# Patient Record
Sex: FEMALE | Race: WHITE | Employment: FULL TIME | URBAN - METROPOLITAN AREA
[De-identification: names, ages, dates, MRNs, and addresses within clinical notes are randomized per-mention and may not be internally consistent; named-entity substitution may affect disease eponyms.]

---

## 2020-09-02 ENCOUNTER — HOSPITAL ENCOUNTER (EMERGENCY)
Age: 25
Discharge: HOME | End: 2020-09-02
Attending: STUDENT IN AN ORGANIZED HEALTH CARE EDUCATION/TRAINING PROGRAM
Payer: COMMERCIAL

## 2020-09-02 ENCOUNTER — APPOINTMENT (OUTPATIENT)
Dept: GENERAL RADIOLOGY | Age: 25
End: 2020-09-02
Payer: COMMERCIAL

## 2020-09-02 VITALS
OXYGEN SATURATION: 100 % | HEART RATE: 84 BPM | WEIGHT: 152 LBS | DIASTOLIC BLOOD PRESSURE: 67 MMHG | RESPIRATION RATE: 17 BRPM | BODY MASS INDEX: 26.93 KG/M2 | SYSTOLIC BLOOD PRESSURE: 125 MMHG | TEMPERATURE: 98.5 F

## 2020-09-02 LAB
ANION GAP SERPL CALCULATED.3IONS-SCNC: 5 MMOL/L (ref 3–16)
BACTERIA: ABNORMAL /HPF
BASOPHILS ABSOLUTE: 0.1 K/UL (ref 0–0.2)
BASOPHILS RELATIVE PERCENT: 0.5 %
BILIRUBIN URINE: NEGATIVE
BLOOD, URINE: NEGATIVE
BUN BLDV-MCNC: 13 MG/DL (ref 7–20)
CALCIUM SERPL-MCNC: 9.6 MG/DL (ref 8.3–10.6)
CHLORIDE BLD-SCNC: 103 MMOL/L (ref 99–110)
CLARITY: ABNORMAL
CO2: 28 MMOL/L (ref 21–32)
COLOR: YELLOW
CREAT SERPL-MCNC: 0.8 MG/DL (ref 0.6–1.1)
EKG ATRIAL RATE: 84 BPM
EKG DIAGNOSIS: NORMAL
EKG P AXIS: 69 DEGREES
EKG P-R INTERVAL: 136 MS
EKG Q-T INTERVAL: 370 MS
EKG QRS DURATION: 74 MS
EKG QTC CALCULATION (BAZETT): 437 MS
EKG R AXIS: 77 DEGREES
EKG T AXIS: 64 DEGREES
EKG VENTRICULAR RATE: 84 BPM
EOSINOPHILS ABSOLUTE: 0.1 K/UL (ref 0–0.6)
EOSINOPHILS RELATIVE PERCENT: 0.4 %
EPITHELIAL CELLS, UA: 4 /HPF (ref 0–5)
GFR AFRICAN AMERICAN: >60
GFR NON-AFRICAN AMERICAN: >60
GLUCOSE BLD-MCNC: 102 MG/DL (ref 70–99)
GLUCOSE URINE: NEGATIVE MG/DL
HCG QUALITATIVE: NEGATIVE
HCT VFR BLD CALC: 43.5 % (ref 36–48)
HEMOGLOBIN: 14.8 G/DL (ref 12–16)
HYALINE CASTS: 2 /LPF (ref 0–8)
KETONES, URINE: NEGATIVE MG/DL
LEUKOCYTE ESTERASE, URINE: ABNORMAL
LYMPHOCYTES ABSOLUTE: 3.1 K/UL (ref 1–5.1)
LYMPHOCYTES RELATIVE PERCENT: 24.2 %
MCH RBC QN AUTO: 30.4 PG (ref 26–34)
MCHC RBC AUTO-ENTMCNC: 33.9 G/DL (ref 31–36)
MCV RBC AUTO: 89.6 FL (ref 80–100)
MICROSCOPIC EXAMINATION: YES
MONOCYTES ABSOLUTE: 1.3 K/UL (ref 0–1.3)
MONOCYTES RELATIVE PERCENT: 10.1 %
NEUTROPHILS ABSOLUTE: 8.3 K/UL (ref 1.7–7.7)
NEUTROPHILS RELATIVE PERCENT: 64.8 %
NITRITE, URINE: NEGATIVE
PDW BLD-RTO: 13.1 % (ref 12.4–15.4)
PH UA: 5.5 (ref 5–8)
PLATELET # BLD: 319 K/UL (ref 135–450)
PMV BLD AUTO: 7.1 FL (ref 5–10.5)
POTASSIUM SERPL-SCNC: 4 MMOL/L (ref 3.5–5.1)
PROTEIN UA: NEGATIVE MG/DL
RBC # BLD: 4.86 M/UL (ref 4–5.2)
RBC UA: 2 /HPF (ref 0–4)
SODIUM BLD-SCNC: 136 MMOL/L (ref 136–145)
SPECIFIC GRAVITY UA: 1.02 (ref 1–1.03)
URINE REFLEX TO CULTURE: YES
URINE TYPE: ABNORMAL
UROBILINOGEN, URINE: 0.2 E.U./DL
WBC # BLD: 12.9 K/UL (ref 4–11)
WBC UA: 16 /HPF (ref 0–5)

## 2020-09-02 PROCEDURE — 36415 COLL VENOUS BLD VENIPUNCTURE: CPT

## 2020-09-02 PROCEDURE — 84703 CHORIONIC GONADOTROPIN ASSAY: CPT

## 2020-09-02 PROCEDURE — 81001 URINALYSIS AUTO W/SCOPE: CPT

## 2020-09-02 PROCEDURE — 84443 ASSAY THYROID STIM HORMONE: CPT

## 2020-09-02 PROCEDURE — 80048 BASIC METABOLIC PNL TOTAL CA: CPT

## 2020-09-02 PROCEDURE — 87086 URINE CULTURE/COLONY COUNT: CPT

## 2020-09-02 PROCEDURE — 71046 X-RAY EXAM CHEST 2 VIEWS: CPT

## 2020-09-02 PROCEDURE — 85025 COMPLETE CBC W/AUTO DIFF WBC: CPT

## 2020-09-02 PROCEDURE — 99285 EMERGENCY DEPT VISIT HI MDM: CPT

## 2020-09-02 PROCEDURE — 93005 ELECTROCARDIOGRAM TRACING: CPT | Performed by: STUDENT IN AN ORGANIZED HEALTH CARE EDUCATION/TRAINING PROGRAM

## 2020-09-02 PROCEDURE — 93010 ELECTROCARDIOGRAM REPORT: CPT | Performed by: INTERNAL MEDICINE

## 2020-09-02 RX ORDER — ESCITALOPRAM OXALATE 5 MG/5ML
10 SOLUTION ORAL DAILY
COMMUNITY

## 2020-09-02 RX ORDER — NITROFURANTOIN 25; 75 MG/1; MG/1
100 CAPSULE ORAL 2 TIMES DAILY
Qty: 14 CAPSULE | Refills: 0 | Status: SHIPPED | OUTPATIENT
Start: 2020-09-02 | End: 2020-09-09

## 2020-09-02 RX ORDER — ALPRAZOLAM 0.25 MG/1
0.25 TABLET ORAL 2 TIMES DAILY PRN
COMMUNITY
Start: 2019-10-08

## 2020-09-02 RX ORDER — ARIPIPRAZOLE 2 MG/1
TABLET ORAL
COMMUNITY
Start: 2020-06-27

## 2020-09-02 ASSESSMENT — PAIN DESCRIPTION - LOCATION: LOCATION: CHEST

## 2020-09-02 ASSESSMENT — PAIN SCALES - GENERAL: PAINLEVEL_OUTOF10: 3

## 2020-09-02 ASSESSMENT — HEART SCORE: ECG: 0

## 2020-09-02 NOTE — ED PROVIDER NOTES
[address] [facility]        Pt Name: Dennise Coburn  MRN: [id number]  [place] 1995  Date of evaluation: 9/2/2020  Provider: JEAN CARLOS Dalal CNP  PCP: [address] [place] [address] [place].     I have seen and evaluated this patient with my supervising physician. Dr. Aislinn Broussard       Chief Complaint   Patient presents with    Palpitations     Pt was sitting when her HR jumped to 140-150's per her apple watch. Called PCP and he wanted her seen. Felt like something was sitting on her chest.  Still feels light headed       HISTORY OF PRESENT ILLNESS   (Location, Timing/Onset, Context/Setting, Quality, Duration, Modifying Factors, Severity, Associated Signs and Symptoms)  Note limiting factors. Dennise Coburn is a 21 y.o. female with medical history of ADHD, OCD, anxiety who presents the ED with complaints of an episode of palpitations, tachycardia, and feeling lightheaded occurred approximately noon today. Patient says she woke this morning and was having some chest discomfort. She took her Xanax 0.25 that she takes every morning. She drank 1 cup of coffee and went to work. Patient says she was taking her lunch break at work whenever the symptoms occurred. She looked at her watch and notes that her heart rate was 140, although patient did not verified by checking her pulse. She said this episode lasted for approximately 2 hours and subsided on its own. Patient is currently asymptomatic and is not having any palpitations or chest pain at this time. She did have more frequent bowel movements today, however they were not loose or watery. Denies any associated nausea, vomiting, diarrhea, neck pain, back pain, headache, dizzy, cough, leg swelling, or fevers. Denies any history of previous cardiac testing to include a Holter, stress, echo, or cardiac cath. She is not on hormone replacement therapy.   Unsure of LMP as she said these are irregular. Denies any smoking, alcohol use, or street drugs. Nursing Notes were all reviewed and agreed with or any disagreements were addressed in the HPI. REVIEW OF SYSTEMS    (2-9 systems for level 4, 10 or more for level 5)     Review of Systems    Positives and Pertinent negatives as per HPI. Except as noted above in the ROS, all other systems were reviewed and negative. PAST MEDICAL HISTORY     Past Medical History:   Diagnosis Date    ADHD (attention deficit hyperactivity disorder)     OCD (obsessive compulsive disorder)     PONV (postoperative nausea and vomiting)     Seasonal allergies          SURGICAL HISTORY     Past Surgical History:   Procedure Laterality Date    OTHER SURGICAL HISTORY Bilateral 07/07/2015    BILATERAL BREAST REDUCTION MAMMOPLASTY    TYMPANOSTOMY TUBE PLACEMENT      WISDOM TOOTH EXTRACTION           CURRENTMEDICATIONS       Discharge Medication List as of 9/2/2020  7:00 PM      CONTINUE these medications which have NOT CHANGED    Details   ALPRAZolam (XANAX) 0.25 MG tablet Take 0.25 mg by mouth 2 times daily as needed. Historical Med      ARIPiprazole (ABILIFY) 2 MG tablet Historical Med      escitalopram (LEXAPRO) 5 MG/5ML solution 10 mg by Nasogastric route dailyHistorical Med      amphetamine-dextroamphetamine (ADDERALL XR) 30 MG XR capsule Take 10 mg by mouth every morning. Historical Med      levocetirizine (XYZAL) 5 MG tablet Take 5 mg by mouth nightly      montelukast (SINGULAIR) 10 MG tablet Take 10 mg by mouth nightly      sertraline (ZOLOFT) 100 MG tablet Take 175 mg by mouth daily      hydrOXYzine (ATARAX) 50 MG tablet Take 50 mg by mouth nightly               ALLERGIES     Bupropion; Duloxetine hcl; Fluoxetine; Oxycodone; and Oxycodone-acetaminophen    FAMILYHISTORY     No family history on file.        SOCIAL HISTORY       Social History     Tobacco Use    Smoking status: Never Smoker   Substance Use Topics    Alcohol use: No    Drug use: No       SCREENINGS      Heart Score for chest pain patients  History: Slightly Suspicious  ECG: Normal  Patient Age: < 45 years  Risk Factors: No risk factors known      PHYSICAL EXAM    (up to 7 for level 4, 8 or more for level 5)     ED Triage Vitals [09/02/20 1457]   BP Temp Temp Source Pulse Resp SpO2 Height Weight   109/74 98.5 °F (36.9 °C) Temporal 86 16 95 % -- 152 lb (68.9 kg)       Physical Exam  Vitals signs and nursing note reviewed. Constitutional:       General: She is awake. Appearance: Normal appearance. She is well-developed and overweight. HENT:      Head: Normocephalic and atraumatic. Nose: Nose normal.   Eyes:      General:         Right eye: No discharge. Left eye: No discharge. Neck:      Musculoskeletal: Normal range of motion. Cardiovascular:      Rate and Rhythm: Normal rate and regular rhythm. Heart sounds: Normal heart sounds. Comments: Pulse 85  Pulmonary:      Effort: Pulmonary effort is normal. No respiratory distress. Breath sounds: Normal breath sounds. Abdominal:      General: Bowel sounds are normal.      Palpations: Abdomen is soft. Tenderness: There is abdominal tenderness in the suprapubic area. Musculoskeletal: Normal range of motion. Right lower leg: No edema. Left lower leg: No edema. Skin:     General: Skin is warm and dry. Coloration: Skin is not pale. Neurological:      Mental Status: She is alert and oriented to person, place, and time. Psychiatric:         Behavior: Behavior normal. Behavior is cooperative.          DIAGNOSTIC RESULTS   LABS:    Labs Reviewed   CBC WITH AUTO DIFFERENTIAL - Abnormal; Notable for the following components:       Result Value    WBC 12.9 (*)     Neutrophils Absolute 8.3 (*)     All other components within normal limits    Narrative:     Performed at:  [facility] [address], [place], [facility] [address]   Phone [phone]   BASIC METABOLIC PANEL - Abnormal; Notable for the following components:    Glucose 102 (*)     All other components within normal limits    Narrative:     Performed at:  [facility] [address] [address] [place] [employer]   Phone [phone]   URINE RT REFLEX TO CULTURE - Abnormal; Notable for the following components:    Clarity, UA CLOUDY (*)     Leukocyte Esterase, Urine MODERATE (*)     All other components within normal limits    Narrative:     Performed at:  [facility] [address], [place], [address] [employer]   Phone [phone]   MICROSCOPIC URINALYSIS - Abnormal; Notable for the following components:    Bacteria, UA 1+ (*)     WBC, UA 16 (*)     All other components within normal limits    Narrative:     Performed at:  [facility] [address] [address] [place] [employer]   Phone [phone]   CULTURE, URINE   HCG, SERUM, QUALITATIVE    Narrative:     Performed at:  [facility] [address], [place], [address]   Phone [phone]   TSH WITH REFLEX       All other labs were within normal range or not returned as of this dictation. EKG: All EKG's are interpreted by the Emergency Department Physician in the absence of a cardiologist.  Please see their note for interpretation of EKG. RADIOLOGY:   Non-plain film images such as CT, Ultrasound and MRI are read by the radiologist. Plain radiographic images are visualized and preliminarily interpreted by the ED Provider with the below findings:        Interpretation per the Radiologist below, if available at the time of this note:    XR CHEST (2 VW)   Final Result   No radiographic evidence of acute pulmonary disease. Xr Chest (2 Vw)    Result Date: 9/2/2020  EXAMINATION: TWO XRAY VIEWS OF THE CHEST 9/2/2020 3:14 pm COMPARISON: None.  HISTORY: ORDERING SYSTEM PROVIDED HISTORY: chest pain TECHNOLOGIST PROVIDED HISTORY: Reason for exam:->chest pain Reason for Exam: cp Acuity: Unknown Type of Exam: Unknown FINDINGS: HEART/MEDIASTINUM: The cardiomediastinal silhouette is within normal limits. PLEURA/LUNGS: There are no focal consolidations or pleural effusions. There is no appreciable pneumothorax. BONES/SOFT TISSUE: No acute abnormality. No radiographic evidence of acute pulmonary disease. PROCEDURES   Unless otherwise noted below, none     Procedures    CRITICAL CARE TIME   N/A    CONSULTS:  None      EMERGENCY DEPARTMENT COURSE and DIFFERENTIAL DIAGNOSIS/MDM:   Vitals:    Vitals:    09/02/20 1457 09/02/20 1818   BP: 109/74 125/67   Pulse: 86 84   Resp: 16 17   Temp: 98.5 °F (36.9 °C)    TempSrc: Temporal    SpO2: 95% 100%   Weight: 152 lb (68.9 kg)        Patient was given the following medications:  Medications - No data to display        Pertinent Labs & Imaging studies reviewed. (See chart for details)   -  Patient seen and evaluated in the emergency department. -  Triage and nursing notes reviewed and incorporated. -  Old chart records reviewed and incorporated. -  Patient case discussed with attending physician, Dr. Joaquin Olivera. They saw and examined patient. -  Differential diagnosis includes: hyperthyroid, dehydration, anxiety, panic attack, UTI, pneumonia, pneumothorax verse COVID-19  -  Work-up included:  See above CXR, EKG, CBC, BMP, hCG, UA  -  ED treatment included:  none  - Consults: Home  -  Results discussed with patient. Labs show  CBC with WBC 12.9. BMP with glucose 102. UA shows cloudy clarity, moderate leukocytes, 1+ bacteria with 16 WBC. Urine culture is pending. CXR shows no radiographic evidence of acute cardiopulmonary disease. Patient was instructed on home care and follow-up with [facility] in outpatient Holter test.  Instructed to stop caffeine intake. Pt was given strict return precautions.  The patient is agreeable with plan of care and disposition.  -  Disposition: None    Applicable in this patient who has low clinical suspicion for pulmonary embolism. Age over 48years old: No  Heart rate greater [id number] bpm: No  Oxygen saturation less than 95%: No  Hemoptysis: No  Exogenous estrogen use: No  Prior history of DVT or PE: No  Unilateral leg swelling: No  Surgery or significant trauma in the past 4 weeks: No    Based on the above, PE can effectively be ruled out without further testing. FINAL IMPRESSION      1. Palpitations    2.  Acute UTI          DISPOSITION/PLAN   DISPOSITION Decision To Discharge 09/02/2020 06:50:37 PM      PATIENT REFERREDTO:  Michelle Esqueda MD  [address] [facility] [phone]  [facility] [address] [place] [address]  [phone]    Call in 2 days  As needed    [facility] Emergency Department  [address] [facility]  [phone]  Go to   As needed    [address] [address] [address]  [phone]  Call   to set-up holter monitor      DISCHARGE MEDICATIONS:  Discharge Medication List as of 9/2/2020  7:00 PM      START taking these medications    Details   nitrofurantoin, macrocrystal-monohydrate, (MACROBID) 100 MG capsule Take 1 capsule by mouth 2 times daily for 7 days, Disp-14 capsule,R-0Print             DISCONTINUED MEDICATIONS:  Discharge Medication List as of 9/2/2020  7:00 PM                 (Please note that portions of this note were completed with a voice recognition program.  Efforts were made to edit the dictations but occasionally words are mis-transcribed.)    JEAN CARLOS Griffin CNP (electronically signed)            JEAN CARLOS Griffin CNP  09/02/20 1946

## 2020-09-02 NOTE — ED PROVIDER NOTES
I independently performed a history and physical on Maribeth Greenfield. All diagnostic, treatment, and disposition decisions were made by myself in conjunction with the advanced practice provider. Briefly, this is a 21 y.o. female history of anxiety attacks on Xanax here for palpitations and elevated heart rate. Patient tells me that she was sitting down this morning when she felt palpitations, shortness of breath and chest pressure. She wears an apple watch which alerted her that her heart rate was in the 140s. She did not check her own pulse. She states that this lasted for several minutes and is now completely resolved. Currently is not having any chest pain, shortness of breath or palpitations. Pt denies unilateral leg pain or swelling, hormone therapy, recent surgery/travel/hospital stay, prior DVT/PE, hemoptysis, or hx of cancer or chemo. 1 cup of coffee daily. States that she recently started a medication for eczema but denies any other medication changes or drug use. No family history of cardiac disease or sudden death. On exam, she is comfortable and conversant. Regular rate and rhythm, normal S1 and S2 without murmurs. Lungs are clear to auscultation bilaterally. No JVD. Abdomen soft with mild suprapubic tenderness, no guarding. Bilateral calves without edema or tenderness. Positive eczematous changes to bilateral lower extremities. EKG  The Ekg interpreted by me in the absence of a cardiologist shows. normal sinus rhythm with a rate of 84  Axis is   Normal  QTc is  normal  Intervals and Durations are unremarkable. No specific ST-T wave changes appreciated. No evidence of acute ischemia.    No prior      Labs Reviewed   CBC WITH AUTO DIFFERENTIAL - Abnormal; Notable for the following components:       Result Value    WBC 12.9 (*)     Neutrophils Absolute 8.3 (*)     All other components within normal limits    Narrative:     Performed at:  [facility]  [address]   Phone [phone]   BASIC METABOLIC PANEL - Abnormal; Notable for the following components:    Glucose 102 (*)     All other components within normal limits    Narrative:     Performed at:  [facility]  [address] [place]   Phone [phone]   URINE RT REFLEX TO CULTURE - Abnormal; Notable for the following components:    Clarity, UA CLOUDY (*)     Leukocyte Esterase, Urine MODERATE (*)     All other components within normal limits    Narrative:     Performed at:  [facility]  [address]   Phone [phone]   MICROSCOPIC URINALYSIS - Abnormal; Notable for the following components:    Bacteria, UA 1+ (*)     WBC, UA 16 (*)     All other components within normal limits    Narrative:     Performed at:  [facility]  [address]   Phone [phone]   CULTURE, URINE   HCG, SERUM, QUALITATIVE    Narrative:     Performed at:  [facility]  [address]   Phone [phone]   TSH WITH REFLEX             Screenings           Heart Score for chest pain patients  History: Slightly Suspicious  ECG: Normal  Patient Age: < 45 years  Risk Factors: No risk factors known       MDM  Course and MDM:  Patient is 50-year-old female presenting to the ER for palpitations in the setting of tachycardia documented on her apple watch. On arrival she is comfortable appearing and hemodynamically stable. She has an unremarkable cardiorespiratory exam.  She is PERC negative and PE is ruled out. EKG as above is nonischemic without signs of arrhythmia, Wellens or Brugada's.  HEART score is 0, low suspicion for ACS.  laboratory studies are unremarkable with exception of UTI and she is having some mild suprapubic tenderness on exam so will treat as an outpatient with Macrobid. I counseled her to avoid any caffeine intake. We have put in a referral for Holter monitor which she will  tomorrow. She was given strict precautions to return to the ER if she has any new palpitations, chest pain, shortness of breath or near syncope. She is agreeable to plan. Patient Referrals:  Jerson Matamoros MD  [id number] [facility] [address]  [facility]. [address] [address]  [phone]    Call in 2 days  As needed    [facility] Emergency Department  [address] [facility]  [phone]  Go to   As needed    [address] [address]  [address] [place]  [phone]  Call   to set-up holter monitor      Discharge Medications:  Discharge Medication List as of 9/2/2020  7:00 PM      START taking these medications    Details   nitrofurantoin, macrocrystal-monohydrate, (MACROBID) 100 MG capsule Take 1 capsule by mouth 2 times daily for 7 days, Disp-14 capsule,R-0Print             FINAL IMPRESSION  1. Palpitations    2. Acute UTI        Blood pressure 125/67, pulse 84, temperature 98.5 °F (36.9 °C), temperature source Temporal, resp. rate 17, weight 152 lb (68.9 kg), SpO2 100 %.      For further details of [facility] emergency department encounter, please see documentation by advanced practice provider       Evon Licea MD  09/02/20 7089

## 2020-09-03 ENCOUNTER — TELEPHONE (OUTPATIENT)
Dept: CARDIOLOGY CLINIC | Age: 25
End: 2020-09-03

## 2020-09-03 LAB
TSH REFLEX: 0.82 UIU/ML (ref 0.27–4.2)
URINE CULTURE, ROUTINE: NORMAL

## 2020-09-03 NOTE — TELEPHONE ENCOUNTER
Noted. Edwin Horn (R[facility]INSON), Obstetrics and Gynecology  [address] [place] [address]  Phone: [phone]  Fax: [phone]

## 2020-09-03 NOTE — TELEPHONE ENCOUNTER
Order for holter in now in patient's chart, please call pt to schedule nurse visit for CAM patch holter.

## 2020-09-03 NOTE — TELEPHONE ENCOUNTER
Pt was sent to the [facility] ER yesterday by her PCP and they told her to call us to set up a Holter monitor for palpitations and elevated heart rate. There is no order in the computer, so I reached out to Dianna Christianson who then instructed me to put in a message to see if we can put an order in for this pt to come in and get one. Pt also states she is currently having palpitations and that her heart is racing. Pt was really insistent on the phone that she needs to get this Holter today.     Please reach out to the pt at [phone]

## 2020-09-11 ENCOUNTER — TELEPHONE (OUTPATIENT)
Dept: CARDIOLOGY CLINIC | Age: 25
End: 2020-09-11

## 2020-09-11 PROCEDURE — 0298T PR EXT ECG > 48HR TO 21 DAY REVIEW AND INTERPRETATN: CPT | Performed by: INTERNAL MEDICINE

## 2020-09-11 PROCEDURE — 0296T PR EXT ECG > 48HR TO 21 DAY RCRD W/CONECT INTL RCRD: CPT | Performed by: INTERNAL MEDICINE

## 2020-09-11 NOTE — TELEPHONE ENCOUNTER
Called patient with result message from [facility] CAM 48 hour holter monitor. Patient verbalized and confirmed understanding. Placed in [employer].

## 2021-06-11 ENCOUNTER — APPOINTMENT (OUTPATIENT)
Dept: CT IMAGING | Age: 26
End: 2021-06-11
Payer: COMMERCIAL

## 2021-06-11 ENCOUNTER — HOSPITAL ENCOUNTER (EMERGENCY)
Age: 26
Discharge: HOME | End: 2021-06-11
Attending: EMERGENCY MEDICINE
Payer: COMMERCIAL

## 2021-06-11 VITALS
RESPIRATION RATE: 20 BRPM | HEART RATE: 72 BPM | BODY MASS INDEX: 30.65 KG/M2 | WEIGHT: 173 LBS | SYSTOLIC BLOOD PRESSURE: 111 MMHG | DIASTOLIC BLOOD PRESSURE: 89 MMHG | TEMPERATURE: 98.3 F | HEIGHT: 63 IN | OXYGEN SATURATION: 100 %

## 2021-06-11 DIAGNOSIS — K31.84 GASTROPARESIS: Primary | ICD-10-CM

## 2021-06-11 LAB
A/G RATIO: 1.2 (ref 1.1–2.2)
ALBUMIN SERPL-MCNC: 4 G/DL (ref 3.4–5)
ALP BLD-CCNC: 94 U/L (ref 40–129)
ALT SERPL-CCNC: 54 U/L (ref 10–40)
ANION GAP SERPL CALCULATED.3IONS-SCNC: 7 MMOL/L (ref 3–16)
AST SERPL-CCNC: 31 U/L (ref 15–37)
BASOPHILS ABSOLUTE: 0 K/UL (ref 0–0.2)
BASOPHILS RELATIVE PERCENT: 0.3 %
BILIRUB SERPL-MCNC: <0.2 MG/DL (ref 0–1)
BUN BLDV-MCNC: 13 MG/DL (ref 7–20)
CALCIUM SERPL-MCNC: 9.1 MG/DL (ref 8.3–10.6)
CHLORIDE BLD-SCNC: 104 MMOL/L (ref 99–110)
CO2: 25 MMOL/L (ref 21–32)
CREAT SERPL-MCNC: 0.7 MG/DL (ref 0.6–1.1)
EOSINOPHILS ABSOLUTE: 0.1 K/UL (ref 0–0.6)
EOSINOPHILS RELATIVE PERCENT: 0.9 %
GFR AFRICAN AMERICAN: >60
GFR NON-AFRICAN AMERICAN: >60
GLOBULIN: 3.3 G/DL
GLUCOSE BLD-MCNC: 81 MG/DL (ref 70–99)
HCG QUALITATIVE: NEGATIVE
HCT VFR BLD CALC: 39.4 % (ref 36–48)
HEMOGLOBIN: 13.3 G/DL (ref 12–16)
LIPASE: 27 U/L (ref 13–60)
LYMPHOCYTES ABSOLUTE: 3.4 K/UL (ref 1–5.1)
LYMPHOCYTES RELATIVE PERCENT: 25.9 %
MCH RBC QN AUTO: 29.8 PG (ref 26–34)
MCHC RBC AUTO-ENTMCNC: 33.8 G/DL (ref 31–36)
MCV RBC AUTO: 88.1 FL (ref 80–100)
MONOCYTES ABSOLUTE: 1.3 K/UL (ref 0–1.3)
MONOCYTES RELATIVE PERCENT: 9.6 %
NEUTROPHILS ABSOLUTE: 8.4 K/UL (ref 1.7–7.7)
NEUTROPHILS RELATIVE PERCENT: 63.3 %
PDW BLD-RTO: 13.2 % (ref 12.4–15.4)
PLATELET # BLD: 323 K/UL (ref 135–450)
PMV BLD AUTO: 6.9 FL (ref 5–10.5)
POTASSIUM REFLEX MAGNESIUM: 4 MMOL/L (ref 3.5–5.1)
RBC # BLD: 4.47 M/UL (ref 4–5.2)
SODIUM BLD-SCNC: 136 MMOL/L (ref 136–145)
TOTAL PROTEIN: 7.3 G/DL (ref 6.4–8.2)
TSH REFLEX: 1.16 UIU/ML (ref 0.27–4.2)
WBC # BLD: 13.3 K/UL (ref 4–11)

## 2021-06-11 PROCEDURE — 99283 EMERGENCY DEPT VISIT LOW MDM: CPT

## 2021-06-11 PROCEDURE — 74177 CT ABD & PELVIS W/CONTRAST: CPT

## 2021-06-11 PROCEDURE — 83690 ASSAY OF LIPASE: CPT

## 2021-06-11 PROCEDURE — 84703 CHORIONIC GONADOTROPIN ASSAY: CPT

## 2021-06-11 PROCEDURE — 84443 ASSAY THYROID STIM HORMONE: CPT

## 2021-06-11 PROCEDURE — 85025 COMPLETE CBC W/AUTO DIFF WBC: CPT

## 2021-06-11 PROCEDURE — 6360000004 HC RX CONTRAST MEDICATION: Performed by: EMERGENCY MEDICINE

## 2021-06-11 PROCEDURE — 36415 COLL VENOUS BLD VENIPUNCTURE: CPT

## 2021-06-11 PROCEDURE — 2580000003 HC RX 258: Performed by: EMERGENCY MEDICINE

## 2021-06-11 PROCEDURE — 80053 COMPREHEN METABOLIC PANEL: CPT

## 2021-06-11 RX ORDER — SUCRALFATE 1 G/1
1 TABLET ORAL 4 TIMES DAILY
COMMUNITY

## 2021-06-11 RX ORDER — FAMOTIDINE 10 MG
10 TABLET ORAL 2 TIMES DAILY
COMMUNITY

## 2021-06-11 RX ORDER — TOPIRAMATE 100 MG/1
100 TABLET, FILM COATED ORAL NIGHTLY PRN
COMMUNITY

## 2021-06-11 RX ORDER — PANTOPRAZOLE SODIUM 40 MG/1
40 GRANULE, DELAYED RELEASE ORAL
COMMUNITY

## 2021-06-11 RX ORDER — METOCLOPRAMIDE 10 MG/1
10 TABLET ORAL
Qty: 120 TABLET | Refills: 3 | Status: SHIPPED | OUTPATIENT
Start: 2021-06-11

## 2021-06-11 RX ORDER — MODAFINIL 100 MG/1
100 TABLET ORAL DAILY
COMMUNITY

## 2021-06-11 RX ORDER — PROMETHAZINE HYDROCHLORIDE 25 MG/1
25 TABLET ORAL EVERY 6 HOURS PRN
COMMUNITY

## 2021-06-11 RX ORDER — AZELASTINE 1 MG/ML
1 SPRAY, METERED NASAL 2 TIMES DAILY
COMMUNITY

## 2021-06-11 RX ORDER — LORAZEPAM 2 MG/1
2 TABLET ORAL NIGHTLY PRN
COMMUNITY

## 2021-06-11 RX ORDER — POLYETHYLENE GLYCOL 3350 17 G/17G
17 POWDER, FOR SOLUTION ORAL DAILY PRN
COMMUNITY

## 2021-06-11 RX ORDER — 0.9 % SODIUM CHLORIDE 0.9 %
1000 INTRAVENOUS SOLUTION INTRAVENOUS ONCE
Status: COMPLETED | OUTPATIENT
Start: 2021-06-11 | End: 2021-06-11

## 2021-06-11 RX ORDER — ONDANSETRON 4 MG/1
4 TABLET, FILM COATED ORAL EVERY 8 HOURS PRN
COMMUNITY

## 2021-06-11 RX ORDER — LEVOMEFOLATE/ALGAL OIL 15-90.314
CAPSULE ORAL
COMMUNITY

## 2021-06-11 RX ORDER — DOCUSATE SODIUM 100 MG/1
100 CAPSULE, LIQUID FILLED ORAL 2 TIMES DAILY
COMMUNITY

## 2021-06-11 RX ADMIN — IOPAMIDOL 80 ML: 755 INJECTION, SOLUTION INTRAVENOUS at 18:46

## 2021-06-11 RX ADMIN — SODIUM CHLORIDE 1000 ML: 0.9 INJECTION, SOLUTION INTRAVENOUS at 17:00

## 2021-06-11 ASSESSMENT — ENCOUNTER SYMPTOMS
CONSTIPATION: 1
VOMITING: 1
ABDOMINAL PAIN: 1
NAUSEA: 1
DIARRHEA: 0

## 2021-06-11 NOTE — ED NOTES
Boxed meal given turkey sandwich and soda per patients request, Dr. Olga White in to speak with patient regarding results     Cheryle Shipper, RN  06/11/21 1950

## 2021-06-11 NOTE — ED PROVIDER NOTES
[facility] ENCOUNTER          ATTENDING PHYSICIAN NOTE       Date of evaluation: 6/11/2021    Chief Complaint     Abdominal Pain and Depression      History of Present Illness     Vandana Veliz is a 31 y.o. female a history of anxiety, OCD, ADHD, TBI (2016) with post-concussion syndrome, post-traumatic headache, chronic pain, low back pain, neck pain. About a year ago she began having generalized abdominal pain with intractable nausea and since then has undergone testing and been seen by GI on multiple occasions. She had a recent EGD that showed ulcerative gastritis and a gastric emptying study that showed delayed gastric emptying. She is currently on Phenergan and Zofran for the nausea but it is not helping. She states that she continues to vomit 2-3 times a day and despite calling on multiple occasions to get a follow-up appointment with her GI doctor through [facility], she feels she has been given the run around and is unable to schedule any type of follow-up to determine if a different type of medication or additional testing is necessary. Apparently when she reached out today she mentioned something about suicidal thoughts and they told her to come to the emergency department for that reason before they would be willing to see her again. Currently the patient is not suicidal and she does not want to hurt herself and has no current thoughts of harming herself. She just states that the frustration drives her to feel like there is no hope and she does not want to do therefore she expresses herself as having suicidal thoughts which are not actually true at this time. The patient is currently having some mild periumbilical and epigastric discomfort but is not currently nauseous.  The patient has been given dietary recommendations which have not helped and she has been put on multiple different medications which have not helped and she is not sure where to go since symptoms are not improving and this has been going on for a year. Review of Systems     Review of Systems   Constitutional: Negative for chills and fever. Cardiovascular: Negative for chest pain. Gastrointestinal: Positive for abdominal pain, constipation, nausea and vomiting. Negative for diarrhea. Genitourinary: Negative for difficulty urinating and dysuria. Neurological: Negative for light-headedness. Psychiatric/Behavioral: Negative for self-injury. All other systems reviewed and are negative. Past Medical, Surgical, Family, and Social History     She has a past medical history of ADHD (attention deficit hyperactivity disorder), Anxiety, Depression, Gastroparesis, OCD (obsessive compulsive disorder), PONV (postoperative nausea and vomiting), PTSD (post-traumatic stress disorder), Seasonal allergies, and Vestibular migraine. She has a past surgical history that includes Tympanostomy tube placement; Banco tooth extraction; and other surgical history (Bilateral, 07/07/2015). Her family history is not on file. She reports that she has never smoked. She has never used smokeless tobacco. She reports that she does not drink alcohol and does not use drugs. Medications     Previous Medications    ALPRAZOLAM (XANAX) 0.25 MG TABLET    Take 0.25 mg by mouth 2 times daily as needed. ARIPIPRAZOLE (ABILIFY) 2 MG TABLET        AZELASTINE (ASTELIN) 0.1 % NASAL SPRAY    1 spray by Nasal route 2 times daily Use in each nostril as directed    DOCUSATE SODIUM (COLACE) 100 MG CAPSULE    Take 100 mg by mouth 2 times daily    DUPILUMAB (DUPIXENT) 200 MG/1. 14ML SOSY INJECTION    Inject 200 mg into the skin once    ESCITALOPRAM (LEXAPRO) 5 MG/5ML SOLUTION    10 mg by Nasogastric route daily    FAMOTIDINE (PEPCID) 10 MG TABLET    Take 10 mg by mouth 2 times daily    L-METHYLFOLATE-ALGAE (DEPLIN 15) 15-90.314 MG CAPS    Take by mouth    LEVOCETIRIZINE (XYZAL) 5 MG TABLET    Take 5 mg by mouth nightly    LORAZEPAM (ATIVAN) 2 MG TABLET    Take 2 mg by mouth nightly as needed for Anxiety. MODAFINIL (PROVIGIL) 100 MG TABLET    Take 100 mg by mouth daily. MONTELUKAST (SINGULAIR) 10 MG TABLET    Take 10 mg by mouth nightly    ONDANSETRON (ZOFRAN) 4 MG TABLET    Take 4 mg by mouth every 8 hours as needed for Nausea or Vomiting    PANTOPRAZOLE SODIUM (PROTONIX) 40 MG PACK PACKET    Take 40 mg by mouth every morning (before breakfast)    POLYETHYLENE GLYCOL (GLYCOLAX) 17 G PACKET    Take 17 g by mouth daily as needed for Constipation    PROMETHAZINE (PHENERGAN) 25 MG TABLET    Take 25 mg by mouth every 6 hours as needed for Nausea    SUCRALFATE (CARAFATE) 1 GM TABLET    Take 1 g by mouth 4 times daily    TOPIRAMATE (TOPAMAX) 100 MG TABLET    Take 100 mg by mouth nightly as needed       Allergies     She is allergic to atarax [hydroxyzine], bupropion, duloxetine, duloxetine hcl, fluoxetine, oxycodone, oxycodone-acetaminophen, and zoloft [sertraline]. Physical Exam     INITIAL VITALS: BP: (!) 119/106, Temp: 98.3 °F (36.8 °C), Pulse: 78, Resp: 18, SpO2: 98 %   Physical Exam  Vitals and nursing note reviewed. Constitutional:       General: She is not in acute distress. Appearance: She is well-developed. She is not diaphoretic. Cardiovascular:      Rate and Rhythm: Normal rate and regular rhythm. Pulmonary:      Effort: Pulmonary effort is normal.   Abdominal:      General: Bowel sounds are normal. There is no distension. Palpations: Abdomen is soft. Tenderness: There is abdominal tenderness in the epigastric area and periumbilical area. There is no guarding or rebound. Musculoskeletal:         General: Normal range of motion. Cervical back: Normal range of motion. Skin:     General: Skin is warm and dry. Neurological:      Mental Status: She is alert and oriented to person, place, and time.    Psychiatric:         Behavior: Behavior normal.         Diagnostic Results     RADIOLOGY:  CT ABDOMEN PELVIS W IV CONTRAST Additional Contrast? None   Final Result      1. No acute abnormality in the abdomen and pelvis. LABS:   Results for orders placed or performed during the hospital encounter of 06/11/21   CBC Auto Differential   Result Value Ref Range    WBC 13.3 (H) 4.0 - 11.0 K/uL    RBC 4.47 4.00 - 5.20 M/uL    Hemoglobin 13.3 12.0 - 16.0 g/dL    Hematocrit 39.4 36.0 - 48.0 %    MCV 88.1 80.0 - 100.0 fL    MCH 29.8 26.0 - 34.0 pg    MCHC 33.8 31.0 - 36.0 g/dL    RDW 13.2 12.4 - 15.4 %    Platelets 402 671 - 025 K/uL    MPV 6.9 5.0 - 10.5 fL    Neutrophils % 63.3 %    Lymphocytes % 25.9 %    Monocytes % 9.6 %    Eosinophils % 0.9 %    Basophils % 0.3 %    Neutrophils Absolute 8.4 (H) 1.7 - 7.7 K/uL    Lymphocytes Absolute 3.4 1.0 - 5.1 K/uL    Monocytes Absolute 1.3 0.0 - 1.3 K/uL    Eosinophils Absolute 0.1 0.0 - 0.6 K/uL    Basophils Absolute 0.0 0.0 - 0.2 K/uL   Comprehensive Metabolic Panel w/ Reflex to MG   Result Value Ref Range    Sodium 136 136 - 145 mmol/L    Potassium reflex Magnesium 4.0 3.5 - 5.1 mmol/L    Chloride 104 99 - 110 mmol/L    CO2 25 21 - 32 mmol/L    Anion Gap 7 3 - 16    Glucose 81 70 - 99 mg/dL    BUN 13 7 - 20 mg/dL    CREATININE 0.7 0.6 - 1.1 mg/dL    GFR Non-African American >60 >60    GFR African American >60 >60    Calcium 9.1 8.3 - 10.6 mg/dL    Total Protein 7.3 6.4 - 8.2 g/dL    Albumin 4.0 3.4 - 5.0 g/dL    Albumin/Globulin Ratio 1.2 1.1 - 2.2    Total Bilirubin <0.2 0.0 - 1.0 mg/dL    Alkaline Phosphatase 94 40 - 129 U/L    ALT 54 (H) 10 - 40 U/L    AST 31 15 - 37 U/L    Globulin 3.3 g/dL   Lipase   Result Value Ref Range    Lipase 27.0 13.0 - 60.0 U/L   HCG Qualitative, Serum   Result Value Ref Range    hCG Qual Negative Detects HCG level >10 MIU/mL       RECENT VITALS:  BP: 111/89,Temp: 98.3 °F (36.8 °C), Pulse: 72, Resp: 20, SpO2: 100 %       ED Course     Nursing Notes, Past Medical Hx, Past Surgical Hx, Social Hx,Allergies, and Family Hx were reviewed.     patient was given the following medications:  Orders Placed This Encounter   Medications    0.9 % sodium chloride bolus    iopamidol (ISOVUE-370) 76 % injection 80 mL    metoclopramide (REGLAN) 10 MG tablet     Sig: Take 1 tablet by mouth 3 times daily (with meals)     Dispense:  120 tablet     Refill:  3       CONSULTS:  None    MEDICAL DECISIONMAKING / ASSESSMENT / Lesa Litzy is a 31 y.o. female with a 1 year or so history of chronic nausea and vomiting with abdominal pain which has incurred a diagnosis of gastroparesis. The patient is having difficulty getting into see her gastroenterologist and does not feel the medication she is on are helping. She is looking for some alternate answers since she feels that the \"medical system is failing me\". The patient has benign exam here but has mild leukocytosis. Compared with recent labs in May, her white blood cell count at that time was normal.  Given the abdominal pain and persistent symptoms, and possibility that something new has developed, we discussed the possibility of performing another CT scan, the last of which was done about a year ago. Using shared decision making, the decision was made to move forward with CT scan which was normal.  It was felt at this time that the patient should be trialed on Reglan and be taken off the Phenergan since it was not helping and was doing nothing but making her sleepy. Zofran may be continued. My suspicion is that her gastroparesis may be more related to the psychiatric medications that she is taking and she is aware of this and plans to address this separately and hopes to get off some of these medications. In the interim we will start the Reglan. She will be given information on our GI on-call to follow-up should she want a second opinion outside the [facility] system or if she is unable to get into see them in [facility].   She is clearly not voicing any suicidal thoughts or ideation at this time and I did not feel that she needed this issue addressed tonight. It sounds as if she expressed the sentiments over the phone earlier out of frustration more than anything. The patient is agreeable to this plan and will be discharged home with a prescription for Reglan and some dietary instructions regarding gastroparesis. Clinical Impression     1.  Gastroparesis        Disposition     PATIENT REFERRED TO:  Monserrat Alejandra  [address] [place] [address] [address]  [place]  [phone]    Schedule an appointment as soon as possible for a visit in 1 week  Follow up within 1 week, Return to ED sooner if symptoms worsen,     Aidan Rizzo MD  [address], [address] [place] [address] Dr:  New Prescriptions    METOCLOPRAMIDE (REGLAN) 10 MG TABLET    Take 1 tablet by mouth 3 times daily (with meals)       DISPOSITION Decision To Discharge 06/11/2021 07:31:42 PM       Chaitanya Laboy MD  06/11/21 2025

## 2021-06-11 NOTE — ED TRIAGE NOTES
Pt arrived to ED \"because the health care system has failed me\". Reports having abdominal pain for over a year, pain worse over past several weeks. Diagnosed with gastroparesis, states her doctors keep prescribing her medications without providing her answers. She has been calling office for 2 weeks and not able to get appointment.  Also checking in for depression/anxiety/psych issues but denies SI.